# Patient Record
Sex: MALE | Race: WHITE
[De-identification: names, ages, dates, MRNs, and addresses within clinical notes are randomized per-mention and may not be internally consistent; named-entity substitution may affect disease eponyms.]

---

## 2018-06-18 NOTE — PCM.PREANE
Preanesthetic Assessment





- Anesthesia/Transfusion/Family Hx


Anesthesia History: Prior Anesthesia Without Reaction


Family History of Anesthesia Reaction: No





- Review of Systems


General: No Symptoms


Pulmonary: No Symptoms


Cardiovascular: Other (arrythmia a flutter)


Gastrointestinal: No Symptoms


Neurological: No Symptoms


Other: Reports: None, Easy Bleeding (on elliquis)





- Physical Assessment


O2 Sat by Pulse Oximetry: 93


Respiratory Rate: 18


Vital Signs: 





 Last Vital Signs











Temp  36.8 C   06/18/18 09:30


 


Pulse  63   06/18/18 10:00


 


Resp  18   06/18/18 10:00


 


BP  128/80   06/18/18 10:00


 


Pulse Ox  93 L  06/18/18 10:00











Height: 1.68 m


Weight: 82.7 kg


ASA Class: 2


Mental Status: Alert & Oriented x3


Airway Class: Mallampati = 1


Dentition: Reports: Normal Dentition


ROM/Head Extension: Full


Lungs: Clear to Auscultation, Normal Respiratory Effort


Cardiovascular: Regular Rate, Regular Rhythm





- Allergies


Allergies/Adverse Reactions: 


 Allergies











Allergy/AdvReac Type Severity Reaction Status Date / Time


 


No Known Allergies Allergy   Verified 03/11/15 14:53














- Blood


Blood Available: No





- Anesthesia Plan


Pre-Op Medication Ordered: None





- Acknowledgements


Anesthesia Type Planned: General Anesthesia


Pt an Appropriate Candidate for the Planned Anesthesia: Yes


Alternatives and Risks of Anesthesia Discussed w Pt/Guardian: Yes


Pt/Guardian Understands and Agrees with Anesthesia Plan: Yes


Additional Comments: 





scheduled for elective cardioversion of an outpatient with a flutter, who has 

been anticoagulated and placed on antiarrythmics for rate control.  

Appropriately NPO, good airway, consent obtained, questions answered. 





PreAnesthesia Questionnaire





- CURRENT (IN HOUSE) MEDS


Current Meds: 





 Current Medications





Lactated Ringer's (Ringers, Lactated)  1,000 mls @ 125 mls/hr IV ASDIRECTED AFSHIN





Discontinued Medications





Ephedrine Sulfate (Ephedrine Sulfate) Confirm Administered Dose 50 mg .ROUTE 

.STK-MED ONE


   Stop: 06/18/18 10:40


Midazolam HCl (Versed 1 Mg/Ml) Confirm Administered Dose 2 mg .ROUTE .STK-MED 

ONE


   Stop: 06/18/18 10:39


Propofol (Diprivan  20 Ml) Confirm Administered Dose 200 mg .ROUTE .STK-MED ONE


   Stop: 06/18/18 10:39

## 2020-02-23 NOTE — EDM.PDOC
ED HPI GENERAL MEDICAL PROBLEM





- General


Chief Complaint: Upper Extremity Injury/Pain


Stated Complaint: LEFT ANKLE PAIN


Time Seen by Provider: 02/23/20 17:11


Source of Information: Reports: Patient


History Limitations: Reports: No Limitations





- History of Present Illness


INITIAL COMMENTS - FREE TEXT/NARRATIVE: 


HISTORY AND PHYSICAL:





History of present illness:


Patient is a 64-year-old male presents to the ED With complaint of left ankle 

injury. Patient states that 1 week ago he was getting a horse out of a trailer. 

He states the horse back up over his left leg either stepping on or hitting his 

left ankle. He has been walking on it but states it is not getting better. He 

does take tramadol for knee pain and states he has been needing to take the 

tramadol more frequently due to the pain. 





Review of systems: 


As per history of present illness and below otherwise all systems reviewed and 

negative.





Past medical history: 


As per history of present illness and as reviewed below otherwise 

noncontributory.





Surgical history: 


As per history of present illness and as reviewed below otherwise 

noncontributory.





Social history: 


No reported history of drug or alcohol abuse.





Family history: 


As per history of present illness and as reviewed below otherwise 

noncontributory.





Physical exam:


General: Patient sitting comfortably in no acute distress and nontoxic appearing


HEENT: Atraumatic, normocephalic, pupils reactive, negative for conjunctival 

pallor or scleral icterus, mucous membranes moist, throat clear, neck supple, 

nontender, trachea midline. No meningeal signs. 


Lungs: Clear to auscultation, breath sounds equal bilaterally, chest nontender.


Heart: S1S2, regular, negative for clicks, rubs, or overt murmur.


Abdomen: Soft, nondistended, nontender. Negative for masses or 

hepatosplenomegaly. Negative for costovertebral tenderness. No rigidity, rebound

, guarding.


Pelvis: Stable nontender.


Genitourinary: Deferred.


Rectal: Deferred.


Extremities: Mild swelling to the medial ankle. No obvious deformity. Skin is 

intact. Pain to palpation along the medial malleolus. Atraumatic, negative for 

cords or calf pain. Neurovascular unremarkable.


Neuro: Awake, alert, oriented. Cranial nerves II through XII unremarkable. 

Cerebellum unremarkable. Motor and sensory unremarkable throughout. Exam 

nonfocal.





Notes: 





Diagnostics:


Left ankle x-ray 





Therapeutics:


post mold splint, crutches 





Prescriptions:


Tramadol (#15) 





Impression: 


Posterior malleolus fracture 





Plan:


Ice, elevate, and motrin or tylenol as needed


Follow up with orthopedics, please call the number provided to schedule an 

appointment


Return to ED as needed as discussed








Definitive disposition and diagnosis as appropriate pending reevaluation and 

review of above.








  ** left lower extremity


Pain Score (Numeric/FACES): 5





- Related Data


 Allergies











Allergy/AdvReac Type Severity Reaction Status Date / Time


 


No Known Allergies Allergy   Verified 12/14/18 01:13











Home Meds: 


 Home Meds





Acetaminophen [Tylenol Arthritis] 650 mg PO DAILY 12/14/18 [History]


Amiodarone [Cordarone] 0.5 tab PO DAILY 12/14/18 [History]


Metoprolol Tartrate 1 tab PO BID 12/14/18 [History]


Omeprazole 1 cap PO DAILY 12/14/18 [History]


sitaGLIPtin Phos/Metformin HCl [Janumet 50-1,000 MG] 1 tab PO BID 12/14/18 [

History]


traMADol HCl [Tramadol HCl ER] 1 tab PO DAILY 12/14/18 [History]


traMADol HCl [Tramadol HCl] 1 tab PO DAILY PRN 12/14/18 [History]











Past Medical History


Endocrine/Metabolic History: Reports: Diabetes, Type II





- Past Surgical History


HEENT Surgical History: Reports: Eye Surgery


Cardiovascular Surgical History: Reports: Cardiac Ablation


GI Surgical History: Reports: Hernia, Inguinal





Social & Family History





- Family History


Family Medical History: Noncontributory





- Caffeine Use


Caffeine Use: Reports: Coffee, Soda, Tea





Review of Systems





- Review of Systems


Review Of Systems: Comprehensive ROS is negative, except as noted in HPI.





ED EXAM, GENERAL





- Physical Exam


Exam: See Below (see dictation)





Course





- Vital Signs


Last Recorded V/S: 


 Last Vital Signs











Temp  97.2 F   02/23/20 17:09


 


Pulse  71   02/23/20 17:09


 


Resp  16   02/23/20 17:09


 


BP  158/97 H  02/23/20 17:09


 


Pulse Ox  95   02/23/20 17:09














Departure





- Departure


Time of Disposition: 18:45


Disposition: Home, Self-Care 01


Condition: Good


Clinical Impression: 


 Fracture of malleolus








- Discharge Information


Referrals: 


Donald Rodriguez MD [Primary Care Provider] - 


Forms:  ED Department Discharge


Additional Instructions: 


The following information is given to patients seen in the emergency department 

who are being discharged to home. This information is to outline your options 

for follow-up care. We provide all patients seen in our emergency department 

with a follow-up referral.





The need for follow-up, as well as the timing and circumstances, are variable 

depending upon the specifics of your emergency department visit.





If you don't have a primary care physician on staff, we will provide you with a 

referral. We always advise you to contact your personal physician following an 

emergency department visit to inform them of the circumstance of the visit and 

for follow-up with them and/or the need for any referrals to a consulting 

specialist.





The emergency department will also refer you to a specialist when appropriate. 

This referral assures that you have the opportunity for follow-up care with a 

specialist. All of these measure are taken in an effort to provide you with 

optimal care, which includes your follow-up.





Under all circumstances we always encourage you to contact your private 

physician who remains a resource for coordinating your care. When calling for 

follow-up care, please make the office aware that this follow-up is from your 

recent emergency room visit. If for any reason you are refused follow-up, 

please contact the [facility] Emergency 

Department at [phone] and asked to speak to the emergency department 

charge nurse.











[facility]


Specialty Care - Orthopedic Clinic


[address], [address]


[place] [address]


Phone: [phone]





1. Ice, elevate, and motrin or tylenol as needed. You may take tramadol as 

needed for severe pain. 


2. Follow up with orthopedics, please call the number provided to schedule an 

appointment


3. Return to ED as needed as discussed 





Sepsis Event Note





- Evaluation


Sepsis Screening Result: No Definite Risk





- Focused Exam


Vital Signs: 


 Vital Signs











  Temp Pulse Resp BP Pulse Ox


 


 02/23/20 17:09  97.2 F  71  16  158/97 H  95











Date Exam was Performed: 02/23/20


Time Exam was Performed: 18:44

## 2020-02-23 NOTE — CR
Left ankle: 3 views left ankle were obtained.

 

Comparison: No previous study.

 

Soft tissue swelling is identified.  Plantar spur is noted.  Vascular 

calcification is seen.  Lucent line is identified through the 

posterior malleolus likely due to fracture.  Bony density noted at the

 base of the 5th metatarsal which is well-corticated most likely 

represents an old injury.  No additional abnormality is appreciated.

 

Impression:

1.  Probable acute posterior malleolus fracture.  Please correlate 

that patient is symptomatic to this area.

2.  Bony density off the base of the 5th metatarsal most likely due to

 old ununited fracture.  If patient is symptomatic to this area, foot 

exam then recommended.

3.  Soft tissue swelling and other findings as noted above.

 

Diagnostic code #3

 

This report was dictated in [place] Standard Time

## 2020-11-03 ENCOUNTER — HOSPITAL ENCOUNTER (EMERGENCY)
Dept: HOSPITAL 56 - MW.ED | Age: 65
Discharge: HOME | End: 2020-11-03
Payer: MEDICARE

## 2020-11-03 VITALS — HEART RATE: 73 BPM | SYSTOLIC BLOOD PRESSURE: 156 MMHG | DIASTOLIC BLOOD PRESSURE: 79 MMHG

## 2020-11-03 DIAGNOSIS — S02.31XA: Primary | ICD-10-CM

## 2020-11-03 DIAGNOSIS — Z79.899: ICD-10-CM

## 2020-11-03 DIAGNOSIS — I48.91: ICD-10-CM

## 2020-11-03 DIAGNOSIS — I10: ICD-10-CM
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
## 2020-11-03 DIAGNOSIS — E11.9: ICD-10-CM

## 2020-11-03 PROCEDURE — 70450 CT HEAD/BRAIN W/O DYE: CPT

## 2020-11-03 PROCEDURE — 72125 CT NECK SPINE W/O DYE: CPT

## 2020-11-03 PROCEDURE — 99283 EMERGENCY DEPT VISIT LOW MDM: CPT

## 2020-11-03 PROCEDURE — 70486 CT MAXILLOFACIAL W/O DYE: CPT

## 2020-11-03 NOTE — CT
Indication:



Kicked in the face by a calf yesterday



Technique:



CT examination of the facial bones was performed. Imaging was acquired from 

above the frontal sinuses through the hyoid bone. Contrast not 

administered. Sagittal and coronal reformatted imaging performed



Comparison:



None prior to today



Findings:



There is an orbital floor fracture on the right. This is a relatively large 

fracture where the majority of the floor is downwardly displaced. Maximum 

downward displacement is about 7 millimeters as measured from coronal image 

number 34. This does not have a typical angled trap door morphology but 

rather the near entirety of the floor is downwardly displaced. However, the 

inferior rectus muscle is not entrapped and is above the floor. There is 

soft tissue swelling in this area. There is pre-existing chronic appearing 

sinus mucosal inflammatory disease parent the fracture involves the 

infraorbital canal on the right. No additional fractures. 



There is soft tissue swelling especially in the preseptal right orbital 

area. There may be a subtle fracture of the nasal bone on the right versus 

is a 1 millimeter foreign body as seen on axial image 53 of 82. The globe 

is intact. There is no retro subpleural or intraconal involvement 



Impression:



1. Orbital floor fracture on the right as described. 



2. Right periorbital soft tissue swelling. The globe is intact and there is 

no involvement of the retro subtotal or intraconal space on the right. 



3. Soft tissue swelling overlying the nose especially towards the right. 

There is a tiny fracture versus a tiny foreign body as described



Please note that all CT scans at this facility use dose modulation, 

iterative reconstruction, and/or weight-based dosing when appropriate to 

reduce radiation dose to as low as reasonably achievable.



Dictated by Evgeny Ramos MD @ Nov  3 2020  2:06PM



Signed by Dr. Evgeny Ramos @ Nov  3 2020  2:12PM

## 2020-11-03 NOTE — CT
INDICATION:



Kicked in the face by a calf yesterday. 



COMPARISON:



None 



TECHNIQUE:



CT examination of the cervical spine is performed without contrast using 

spiral technique. Thin axial, sagittal and coronal reconstructions were 

made. 



Please note that all CT scans at this facility use dose modulation, 

iterative reconstruction, and/or weight-based dosing when appropriate to 

reduce radiation dose to as low as reasonably achievable. 



FINDINGS: :



Bone mineral density appears decreased. There is no lytic or blastic 

lesion, fracture or dislocation identified involving the spine. There are 

moderate degenerative changes diffusely. There is an orbital region 

fracture on the right as more fully described in the facial bone report. 



IMPRESSION:



1. No fracture, dislocation or destructive process involving the spine. 

Degenerative changes. 



2. Orbital region fracture on the right as more fully described on the 

facial bone report



Please note that all CT scans at this facility use dose modulation, 

iterative reconstruction, and/or weight-based dosing when appropriate to 

reduce radiation dose to as low as reasonably achievable.



Dictated by Evgeny Ramos MD @ Nov  3 2020  2:02PM



Signed by Dr. Evgeny Ramos @ Nov  3 2020  2:06PM

## 2020-11-03 NOTE — EDM.PDOC
ED HPI GENERAL MEDICAL PROBLEM





- General


Chief Complaint: Trauma


Stated Complaint: TRAUMA ALERT


Time Seen by Provider: 11/03/20 13:12


Source of Information: Reports: Patient


History Limitations: Reports: No Limitations





- History of Present Illness


INITIAL COMMENTS - FREE TEXT/NARRATIVE: 





65-year-old male past medical history hypertension, diabetes presents after 

getting kicked in the head by a cow.  Patient states this occurred about 24 

hours ago.  Hit him in the right thigh.  He notes swelling and pain around the 

right eye.  Denies changes in vision.  Notes mild pain in right posterior neck. 

No difficulty breathing, chest pain, abdominal pain, nausea, vomiting.  He has 

been ambulatory after the accident.  He took tramadol for pain at home.  He is 

on a baby aspirin but denies any other antiplatelet or anticoagulation use.


  ** Right Eye


Pain Score (Numeric/FACES): 3





- Related Data


                                    Allergies











Allergy/AdvReac Type Severity Reaction Status Date / Time


 


No Known Allergies Allergy   Verified 11/03/20 13:27











Home Meds: 


                                    Home Meds





Acetaminophen [Tylenol Arthritis] 650 mg PO ASDIRECTED 12/14/18 [History]


Amiodarone [Cordarone] 1 tab PO DAILY 12/14/18 [History]


Metoprolol Tartrate 1 tab PO BID 12/14/18 [History]


Omeprazole 2 cap PO DAILY 12/14/18 [History]


sitaGLIPtin Phos/Metformin HCl [Janumet 50-1,000 MG] 1 tab PO BID 12/14/18 

[History]


traMADol HCl [Tramadol HCl ER] 1 tab PO DAILY 12/14/18 [History]


traMADol HCl [Tramadol HCl] 1 tab PO DAILY PRN 12/14/18 [History]


Amoxicillin/Potassium Clav [Augmentin 875-125 Tablet] 1 each PO BID 7 Days #14 

tablet 11/03/20 [Rx]


Pioglitazone [Actos] 1 dose PO DAILY 11/03/20 [History]











Past Medical History


Cardiovascular History: Reports: Afib, Hypertension


Other Musculoskeletal History: knee surgery


Endocrine/Metabolic History: Reports: Diabetes, Type II





- Past Surgical History


HEENT Surgical History: Reports: Eye Surgery


Cardiovascular Surgical History: Reports: Cardiac Ablation


GI Surgical History: Reports: Hernia, Inguinal





Social & Family History





- Family History


Family Medical History: Noncontributory





- Caffeine Use


Caffeine Use: Reports: Coffee, Soda, Tea





Review of Systems





- Review of Systems


Review Of Systems: Comprehensive ROS is negative, except as noted in HPI.





ED EXAM, GENERAL





- Physical Exam


Exam: See Below


Exam Limited By: No Limitations


General Appearance: Alert, WD/WN, No Apparent Distress


Eye Exam: Bilateral Eye: EOMI, PERRL


Ears: Normal External Exam


Nose: Normal Inspection


Throat/Mouth: Normal Voice, No Airway Compromise


Head: Normocephalic, Other (significant swelling of R periorbital face w/ 

overlying eyelid abrasions)


Neck: Normal Inspection.  No: Tender Midline


Respiratory/Chest: No Respiratory Distress, Lungs Clear, Normal Breath Sounds, 

No Accessory Muscle Use


Cardiovascular: Normal Peripheral Pulses, Regular Rate, Rhythm


GI/Abdominal: Soft, Non-Tender


Back Exam: Normal Inspection


Extremities: Normal Inspection


Neurological: Alert, Oriented, CN II-XII Intact


Psychiatric: Normal Affect, Normal Mood


Skin Exam: Warm, Dry, Intact





Course





- Vital Signs


Last Recorded V/S: 


                                Last Vital Signs











Temp  98.3 F   11/03/20 13:15


 


Pulse  67   11/03/20 13:15


 


Resp  16   11/03/20 13:15


 


BP  145/80 H  11/03/20 13:15


 


Pulse Ox  98   11/03/20 13:15














- Re-Assessments/Exams


Free Text/Narrative Re-Assessment/Exam: 





11/03/20 13:16


Will get CT head, max-face, C-spine.  Will follow up results and disposition 

accordingly.


11/03/20 14:16


CT imaging is remarkable for orbital floor fracture.  Will discharge with 

prophylactic antibiotics and ophthalmology follow-up.  Return precautions 

discussed for entrapment.





Departure





- Departure


Time of Disposition: 14:16


Disposition: Home, Self-Care 01


Condition: Good


Clinical Impression: 


 Orbital floor (blow-out) closed fracture








- Discharge Information


Prescriptions: 


Amoxicillin/Potassium Clav [Augmentin 875-125 Tablet] 1 each PO BID 7 Days #14 

tablet


Instructions:  Orbital Floor Fracture With Entrapment


Forms:  ED Department Discharge


Additional Instructions: 


CT imaging is remarkable for orbital floor fracture.  You do not have any 

evidence of entrapment of your muscles.  You should follow-up with an 

ophthalmologist for reassessment.  I have also prescribed you antibiotics to 

prevent infection.





The following information is given to patients seen in the emergency department 

who are being discharged to home. This information is to outline your options 

for follow-up care. We provide all patients seen in our emergency department 

with a follow-up referral.





The need for follow-up, as well as the timing and circumstances, are variable 

depending upon the specifics of your emergency department visit.





If you don't have a primary care physician on staff, we will provide you with a 

referral. We always advise you to contact your personal physician following an 

emergency department visit to inform them of the circumstance of the visit and 

for follow-up with them and/or the need for any referrals to a consulting 

specialist.





The emergency department will also refer you to a specialist when appropriate. 

This referral assures that you have the opportunity for follow-up care with a 

specialist. All of these measure are taken in an effort to provide you with 

optimal care, which includes your follow-up.





Under all circumstances we always encourage you to contact your private 

physician who remains a resource for coordinating your care. When calling for 

follow-up care, please make the office aware that this follow-up is from your 

recent emergency room visit. If for any reason you are refused follow-up, please

contact the [facility] Emergency Department

at [phone] and asked to speak to the emergency department charge nurse.





Please follow up with your primary care physician. If you do not have a primary 

care physician, see below:


[facility]


[address] [place] [id number] [phone]





[facility]


[address] [place] [address] [phone]








Sepsis Event Note (ED)





- Focused Exam


Vital Signs: 


                                   Vital Signs











  Temp Pulse Resp BP Pulse Ox


 


 11/03/20 13:15  98.3 F  67  16  145/80 H  98

## 2020-11-03 NOTE — CT
INDICATION:



Kicked in face by a calf yesterday. 



COMPARISON:



None 



TECHNIQUE:



CT examination of the head was performed as axial sections without 

intravenous contrast. Images were obtained from the vertex of the skull 

through the skull base.



Please note that all CT scans at this facility use dose modulation, 

iterative reconstruction, and/or weight-based dosing when appropriate to 

reduce radiation dose to as low as reasonably achievable. 



FINDINGS:



The brain shows no sign of mass lesion, mass effect, hemorrhage, or edema. 



The ventricles and sulci are normal in appearance for the patient`s age.



The visualized portions of the orbits are normal in appearance.



There is soft tissue swelling in the region of the right orbit and there is 

a right orbital region fracture which will be further described in the 

facial bone report. 



IMPRESSION:



1. No visible acute intracranial posttraumatic findings. 



2. Soft tissue swelling in the right orbital region within orbital region 

fracture which is more fully characterized in the facial bone report under 

separate cover



Please note that all CT scans at this facility use dose modulation, 

iterative reconstruction, and/or weight-based dosing when appropriate to 

reduce radiation dose to as low as reasonably achievable.



Dictated by Evgeny Ramos MD @ Nov  3 2020  1:59PM



Signed by Dr. Evgeny Ramos @ Nov  3 2020  2:02PM

## 2021-11-08 ENCOUNTER — HOSPITAL ENCOUNTER (EMERGENCY)
Dept: HOSPITAL 56 - MW.ED | Age: 66
Discharge: HOME | End: 2021-11-08
Payer: MEDICARE

## 2021-11-08 VITALS — HEART RATE: 100 BPM

## 2021-11-08 VITALS — DIASTOLIC BLOOD PRESSURE: 83 MMHG | SYSTOLIC BLOOD PRESSURE: 140 MMHG

## 2021-11-08 DIAGNOSIS — E11.9: ICD-10-CM

## 2021-11-08 DIAGNOSIS — U07.1: Primary | ICD-10-CM

## 2021-11-08 DIAGNOSIS — Z79.899: ICD-10-CM

## 2021-11-08 DIAGNOSIS — I10: ICD-10-CM

## 2021-11-08 PROCEDURE — 99283 EMERGENCY DEPT VISIT LOW MDM: CPT

## 2021-11-08 PROCEDURE — U0002 COVID-19 LAB TEST NON-CDC: HCPCS

## 2021-11-08 NOTE — EDM.PDOC
ED HPI GENERAL MEDICAL PROBLEM





- General


Chief Complaint: General


Stated Complaint: COVID SYMTPOMS


Time Seen by Provider: 11/08/21 21:17





- History of Present Illness


INITIAL COMMENTS - FREE TEXT/NARRATIVE: 





CHIEF COMPLAINT(S): Tired








HISTORY OF PRESENT ILLNESS: This is a 66-year-old man with a past medical 

history of diabetes mellitus, hypertension, atrial fibrillation who comes to the

emergency department with a chief complaint of tired.  The patient states that 

he has been extremely tired and was been sleeping all day.  He states that this 

has been going on for approximately 1 week to 10 days.  He has had intermittent 

fever and some shortness of breath with nonproductive cough.  He states that he 

is in the emergency department because his wife is concerned because he does not

usually sleep this much.  He denies any chest pain, swelling of his legs, recent

travel, recent surgery or prior history of DVT or PE.  They state they are 

mainly concerned that he has Covid





 


REVIEW OF SYSTEMS: 





Constitutional: Positive fatigue and fevers


Eyes: Denies eye pain


Ears, Nose, Mouth, & Throat: Denies earache


Cardiovascular: Denies chest pain


Respiratory: Positive for shortness of breath and nonproductive cough


Gastrointestinal: Denies Nausea, vomiting, diarrhea, hematochezia.


Genitourinary: Denies hematuria


Skin:Denies a rash


MSK: Denies joint pain


Neurological: Denies blurred vision


Psychiatric: Denies depression








PAST MEDICAL HISTORY: As per history of present illness and as reviewed below 

otherwise noncontributory.





SURGICAL HISTORY: As per history of present illness and as reviewed below 

otherwise noncontributory.





SOCIAL HISTORY: As per history of present illness and as reviewed below 

otherwise noncontributory.





FAMILY HISTORY: As per history of present illness and as reviewed below 

otherwise noncontributory.








EXAMINATION OF ORGAN SYSTEMS/BODY AREAS: 





Constitutional: Blood pressure is 140/83, heart rate 85, respiratory rate 22 

with an oxygen saturation of 95% on room air.  Temperature 38.1


General: Middle-aged man who does not appear to be in any acute distress


Psychiatric: Appropriate mood and affect.


Eyes: No scleral icterus or conjunctival erythema


ENMT: Moist mucous membranes. No pharyngeal erythema


Cardiovascular: Regular, rate, and rhythm.  No gallops, murmurs, or rubs.  

Bilateral upper extremity pulses symmetric and intact.  No peripheral edema.  No

JVD.


Respiratory: Lungs clear to auscultation bilaterally.  No wheezes, rales, or 

rhonchi.


Gastrointestinal: Soft, non-tender, non-distended.  Normoactive bowel sounds


Genitourinary: No suprapubic tenderness


Musculoskeletal: Normal range of motion.


Skin: No lesions or abrasions.


Neurological:     Alert, GCS 15








MEDICAL DECISION MAKING AND COURSE IN THE ED WITH INTERPRETATION/REVIEW OF 

DIAGNOSTIC STUDIES: This is a 66-year-old man with a past medical history of 

hypertension, diabetes mellitus who comes to the emergency department with 10 

days of intermittent fever, cough and shortness of breath who is borderline 

hypoxic on room air and borderline febrile who overall appears well.  At this 

time will obtain a COVID-19 swab.  I do not believe any other labs or imaging 

are indicated.  Patient was amenable to this plan.





Laboratory: COVID-19 is positive.





Given the patient is 10 days into his illness the patient is not a candidate for

Regeneron therapy.  I did discuss symptomatic treatment at home and strict 

return precautions.  The patient was amenable discharge and had no further 

questions.





DISPOSITION: The patient was discharged home in stable condition. The patient 

will follow up with primary care physician in 3 to 5 days





CONDITION: Fair





PROCEDURES: None





FINAL IMPRESSION(S)/DIAGNOSES: 





1.  Acute COVID-19 infection





 





Manan Black M.D.





- Related Data


                                    Allergies











Allergy/AdvReac Type Severity Reaction Status Date / Time


 


No Known Allergies Allergy   Verified 11/08/21 20:20











Home Meds: 


                                    Home Meds





Acetaminophen [Tylenol Arthritis] 650 mg PO ASDIRECTED 12/14/18 [History]


Amiodarone [Cordarone] 1 tab PO DAILY 12/14/18 [History]


Metoprolol Tartrate 1 tab PO BID 12/14/18 [History]


Omeprazole 2 cap PO DAILY 12/14/18 [History]


sitaGLIPtin Phos/Metformin HCl [Janumet 50-1,000 MG] 1 tab PO BID 12/14/18 

[History]


traMADol HCl [Tramadol HCl ER] 1 tab PO DAILY 12/14/18 [History]


traMADol HCl [Tramadol HCl] 1 tab PO DAILY PRN 12/14/18 [History]


Pioglitazone [Actos] 1 dose PO DAILY 11/03/20 [History]











Past Medical History


Cardiovascular History: Reports: Afib, Hypertension


Other Musculoskeletal History: knee surgery


Endocrine/Metabolic History: Reports: Diabetes, Type II


Hematologic History: Reports: None





- Past Surgical History


HEENT Surgical History: Reports: Eye Surgery


Cardiovascular Surgical History: Reports: Cardiac Ablation


GI Surgical History: Reports: Hernia, Inguinal





Social & Family History





- Family History


Family Medical History: No Pertinent Family History





- Tobacco Use


Second Hand Smoke Exposure: No





- Caffeine Use


Caffeine Use: Reports: None





- Recreational Drug Use


Recreational Drug Use: No





ED ROS GENERAL





- Review of Systems


Review Of Systems: See Below





ED EXAM, GENERAL





- Physical Exam


Exam: See Below





Course





- Vital Signs


Last Recorded V/S: 


                                Last Vital Signs











Temp  38.1 C   11/08/21 20:17


 


Pulse  100   11/08/21 22:22


 


Resp  18   11/08/21 22:22


 


BP  140/83   11/08/21 20:17


 


Pulse Ox  96   11/08/21 22:22














- Orders/Labs/Meds


Labs: 


                                Laboratory Tests











  11/08/21 Range/Units





  20:20 


 


SARS-CoV-2 RNA (JACQUES)  POSITIVE H  (NEGATIVE)  














Departure





- Departure


Time of Disposition: 22:49


Disposition: Home, Self-Care 01


Condition: Fair


Clinical Impression: 


 COVID-19








- Discharge Information


*PRESCRIPTION DRUG MONITORING PROGRAM REVIEWED*: No


*COPY OF PRESCRIPTION DRUG MONITORING REPORT IN PATIENT LYDIA: No


Instructions:  What You Should Know About COVID-19 to Protect Yourself and 

Others - CDC, 10 Things You Can Do to Manage Your COVID-19 Symptoms at Home - 

[facility] (07/16/2021), COVID-19: How to Protect Yourself and Others - CDC, COVID-19: 

Quarantine vs. Isolation - [facility] (12/17/2020), COVID-19: What to Do If You Are 

Sick- [facility] (03/17/2021)


Referrals: 


Donald Rodriguez MD [Primary Care Provider] - 


Forms:  ED Department Discharge


Additional Instructions: 


You should take acetaminophen 500-1000 mg every 6 hours as needed for fever and 

muscle aches.


Please drink plenty of fluids and get plenty of rest over the next several days.


We would recommend that she get a pulse oximeter from the pharmacy to keep an 

eye on your oxygen level.  If your oxygen level drops below 91%, you should 

return to the ED for evaluation.  You should return to the ER sooner if you 

start having any symptoms of shortness of breath or any other new or concerning 

symptoms.





1.  Your COVID-19 screening is positive.  That means you do have the coronavirus

and you are considered contagious.  Your vital signs and oxygen saturation are 

well enough that you were able to monitor your symptoms at home.  Continue to 

monitor for trouble breathing,  new confusion or inability to arouse, bluish 

lips or face or any of the other symptoms we discussed -if this occurs please 

return to the emergency room.


2.  Please self quarantine over the next 10 days.  Inform any persons that you 

have been in contact with since you started becoming symptomatic that you have 

tested positive; they should be made aware and take the appropriate steps as 

needed.


3.  May alternate Tylenol and ibuprofen as needed for pain and fever management.


4. The [facility] department will be calling you and following up with you. 

The [place] COVID 19 Hotline phone number [phone], They are open Monday - 

Friday 7am - 7pm. Follow up with your primary care provider for re-evaluation 

and re-testing after the 10 day quarantine and discuss when you should be seen.





[facility] - Primary [facility]                                              

 


[address] [place] [address]                                                             

               


Phone: [phone]                                                           

            


Fax: [phone]    





[place] [address]                                                             

               


Phone: [phone]                                                           

                                


 Fax: [phone]





The patient is informed of any results of their evaluation and diagnostic workup

and all questions are answered. They are given discharge instructions and return

precautions. The patient is stable for discharge.  The patient states they 

understand and agree with the plan and that they will return if their symptoms 

get worse or if they have any new concerns.





The following information is given to patients seen in the emergency department 

who are being discharged to home. This information is to outline your options 

for follow-up care. We provide all patients seen in our emergency department 

with a follow-up referral.





The need for follow-up, as well as the timing and circumstances, are variable 

depending upon the specifics of your emergency department visit.





If you don't have a primary care physician on staff, we will provide you with a 

referral. We always advise you to contact your personal physician following an 

emergency department visit to inform them of the circumstance of the visit and 

for follow-up with them and/or the need for any referrals to a consulting 

specialist.





The emergency department will also refer you to a specialist when appropriate. 

This referral assures that you have the opportunity for follow-up care with a 

specialist. All of these measure are taken in an effort to provide you with 

optimal care, which includes your follow-up.





Under all circumstances we always encourage you to contact your private 

physician who remains a resource for coordinating your care. When calling for 

follow-up care, please make the office aware that this follow-up is from your 

recent emergency room visit. If for any reason you are refused follow-up, please

contact the [facility] Emergency Department

at [phone] and asked to speak to the emergency department charge nurse.











Sepsis Event Note (ED)





- Evaluation


Sepsis Screening Result: No Definite Risk





- Focused Exam


Vital Signs: 


                                   Vital Signs











  Temp Pulse Resp BP Pulse Ox


 


 11/08/21 22:22   100  18   96


 


 11/08/21 20:17  38.1 C  85  22 H  140/83  94 L

## 2021-11-11 ENCOUNTER — HOSPITAL ENCOUNTER (EMERGENCY)
Dept: HOSPITAL 56 - MW.ED | Age: 66
Discharge: HOME | End: 2021-11-11
Payer: MEDICARE

## 2021-11-11 VITALS — HEART RATE: 87 BPM | DIASTOLIC BLOOD PRESSURE: 73 MMHG | SYSTOLIC BLOOD PRESSURE: 136 MMHG

## 2021-11-11 DIAGNOSIS — E11.65: ICD-10-CM

## 2021-11-11 DIAGNOSIS — I10: ICD-10-CM

## 2021-11-11 DIAGNOSIS — U07.1: Primary | ICD-10-CM

## 2021-11-11 DIAGNOSIS — I48.91: ICD-10-CM

## 2021-11-11 DIAGNOSIS — Z79.899: ICD-10-CM

## 2021-11-11 LAB
BUN SERPL-MCNC: 24 MG/DL (ref 7–18)
CHLORIDE SERPL-SCNC: 99 MMOL/L (ref 98–107)
CO2 SERPL-SCNC: 28.1 MMOL/L (ref 21–32)
GLUCOSE SERPL-MCNC: 193 MG/DL (ref 74–106)
POTASSIUM SERPL-SCNC: 4.3 MMOL/L (ref 3.5–5.1)
SODIUM SERPL-SCNC: 136 MMOL/L (ref 136–148)

## 2021-11-11 PROCEDURE — 96374 THER/PROPH/DIAG INJ IV PUSH: CPT

## 2021-11-11 PROCEDURE — 85025 COMPLETE CBC W/AUTO DIFF WBC: CPT

## 2021-11-11 PROCEDURE — 99285 EMERGENCY DEPT VISIT HI MDM: CPT

## 2021-11-11 PROCEDURE — 80053 COMPREHEN METABOLIC PANEL: CPT

## 2021-11-11 PROCEDURE — 36415 COLL VENOUS BLD VENIPUNCTURE: CPT

## 2021-11-11 PROCEDURE — 81001 URINALYSIS AUTO W/SCOPE: CPT

## 2021-11-11 PROCEDURE — 71045 X-RAY EXAM CHEST 1 VIEW: CPT

## 2021-11-11 NOTE — EDM.PDOC
ED HPI GENERAL MEDICAL PROBLEM





- General


Chief Complaint: General


Stated Complaint: HASNT BEEN EATING SICK


Time Seen by Provider: 11/11/21 14:23


Source of Information: Reports: Patient


History Limitations: Reports: No Limitations





- History of Present Illness


INITIAL COMMENTS - FREE TEXT/NARRATIVE: 


HISTORY AND PHYSICAL:





History of present illness:


Patient is a 66-year-old male who presents to the emergency room with known 

COVID-19 who has complaints of decreased appetite and nausea/vomiting with 

attempting to eat.  Patient states he hasn't eaten in 3 to 4 days.  Today he att

empted to eat some tomato soup but immediately had an emesis.  He states he 

feels generally weak and fatigued.  He has had COVID type symptoms for the past 

2 weeks. Patient denies any fever, chills, headache, change in vision, syncope 

or near syncope. Denies any chest pain, back pain, shortness of breath or cough.

Denies any abdominal pain, diarrhea, constipation or dysuria. Has not noted any 

blood in urine or stool. 





Review of systems: 


As per history of present illness and below otherwise all systems reviewed and 

negative.





Past medical history: 


As per history of present illness and as reviewed below otherwise 

noncontributory.





Surgical history: 


As per history of present illness and as reviewed below otherwise 

noncontributory.





Social history: 


See social history for further information





Family history: 


As per history of present illness and as reviewed below otherwise 

noncontributory.





Physical exam:


General: Well developed and well nourished. Alert and orientated x 3. Nontoxic i

n appearance and in no acute distress. Vital signs are stable and have been 

reviewed by me. Nursing notes were reviewed. 


HEENT: Atraumatic, normocephalic, pupils equal and reactive bilaterally, negat

rc for conjunctival pallor or scleral icterus, mucous membranes dry, TMs normal

bilaterally, throat clear, neck supple, nontender, trachea midline. No drooling 

or trismus noted. No meningeal signs. No hot potato voice noted. 


Lungs: Clear to auscultation bilaterally. No wheezes, rales, or rhonchi. Chest 

nontender. Normal work of breathing, no accessory muscles used.


Heart: S1S2, regular rate and rhythm without overt murmur, gallops, or rubs. No 

JVD. No peripheral edema


Abdomen: Soft, nondistended, nontender. Normoactive bowel sounds. Negative for 

masses or costovertebral tenderness.


Pelvis: Stable nontender.


Genitourinary/Rectal: Deferred.


Skin: Intact, warm, dry. No lesions or rashes noted.


Hematologic: No petechiae or purpra. Mucosa appropriate color and normal nail 

bed color and refill.


Extremities: Atraumatic, moves all extremities per self without difficulty or 

deficits, negative for cords or calf pain. Neurovascular unremarkable.


Neuro: Awake, alert, oriented. Cranial nerves II through XII unremarkable. 

Cerebellum unremarkable. Motor and sensory unremarkable throughout. Exam 

nonfocal.


Psychiatric: Mood and affect are appropriate.  Normal thought process. Answering

questions appropriately.





Please note that the patient was seen and evaluated during the 2020 SARS-CoV-2 

novel coronavirus pandemic period.  Community viral transmission is ongoing at 

time of this encounter and the emergency department is operating under pandemic 

response procedures.





Medical Decision Making:


Patient is a 66-year-old male who presents to the emergency room with known 

COVID-19 (symptoms x2 weeks) who complains of anorexia, decreased p.o. intake 

and nausea/vomiting after trying to eat today.  Patient was not a candidate for 

monoclonal antibody therapy upon his diagnosis of COVID-19.  Physical exam is 

unremarkable.  His vital signs are stable.  He does clinically appear 

dehydrated, will give him some fluids while waiting on basic labs.





Mild derangements on labs, nothing concerning today.  Patient is a type II 

diabetic and states he has not been taking his medications due to his decreased 

appetite.  Anion gap is 8.9.  Chest x-ray shows questionable patchy right 

infiltrates. I have talked with the patient about today's findings, in addition 

to providing specific details for plan of care.  Reassessment at the time of 

disposition demonstrates that the patient is in no acute distress.  Encourage 

patient to follow-up with his primary care provider for reevaluation and further

management of his type 2 diabetes.  The patient is stable for discharge, 

counseling was provided and we discussed in great detail signs and symptoms that

would prompt them to return to the Emergency Department. Medication, follow up 

and supportive care measures were reviewed and discussed. Voices understanding 

and is agreeable to plan of care. Denies any further questions or concerns at 

this time.





Diagnostics:


CBC, CMP, chest x-ray





Therapeutics:


IV fluid, Zofran, Toradol





Prescription:


Zofran





Impression: 


COVID-19


Type II diabetic





Plan:


1.  You were evaluated today on an emergent basis.  Your lab work and chest x-

ray show no acute or concerning findings.  Your vital signs and oxygen saturati

on are well enough that you were able to monitor your symptoms at home.  

Continue to monitor for trouble breathing,  new confusion or inability to 

arouse, bluish lips or face or any of the other symptoms we discussed -if this 

occurs please return to the emergency room immediately.


2.  Please self quarantine until cleared by [facility] Department.  Inform any

persons that you have been in contact with since you started becoming 

symptomatic that you have tested positive; they should be made aware and take 

the appropriate steps as needed.


3.  Zofran has been prescribed for nausea management.  You can take NyQuil 

during the evening to help get a restful night sleep. May alternate Tylenol and 

ibuprofen as needed for pain and fever management.


4. The [facility] department will be calling you and following up with you. 

The [place] COVID 19 Hotline phone number [phone], They are open Monday - 

Friday 7am - 7pm. Follow up with your primary care provider for re-evaluation as

directed.





Definitive disposition and diagnosis as appropriate pending reevaluation and 

review of above.








- Related Data


                                    Allergies











Allergy/AdvReac Type Severity Reaction Status Date / Time


 


No Known Allergies Allergy   Verified 11/11/21 14:48











Home Meds: 


                                    Home Meds





Acetaminophen [Tylenol Arthritis] 650 mg PO ASDIRECTED 12/14/18 [History]


Amiodarone [Cordarone] 1 tab PO DAILY 12/14/18 [History]


Metoprolol Tartrate 1 tab PO BID 12/14/18 [History]


Omeprazole 2 cap PO DAILY 12/14/18 [History]


sitaGLIPtin Phos/Metformin HCl [Janumet 50-1,000 MG] 1 tab PO BID 12/14/18 

[History]


traMADol HCl [Tramadol HCl ER] 1 tab PO DAILY 12/14/18 [History]


traMADol HCl [Tramadol HCl] 1 tab PO DAILY PRN 12/14/18 [History]


Pioglitazone [Actos] 1 dose PO DAILY 11/03/20 [History]


Ondansetron [Zofran ODT] 4 mg PO Q6H PRN #8 tab.dis 11/11/21 [Rx]











Past Medical History


Cardiovascular History: Reports: Afib, Hypertension


Other Musculoskeletal History: knee surgery


Endocrine/Metabolic History: Reports: Diabetes, Type II


Hematologic History: Reports: None





- Past Surgical History


HEENT Surgical History: Reports: Eye Surgery


Cardiovascular Surgical History: Reports: Cardiac Ablation


GI Surgical History: Reports: Hernia, Inguinal





Social & Family History





- Family History


Family Medical History: No Pertinent Family History





- Caffeine Use


Caffeine Use: Reports: None





ED ROS GENERAL





- Review of Systems


Review Of Systems: Comprehensive ROS is negative, except as noted in HPI.





ED EXAM, GENERAL





- Physical Exam


Exam: See Below (See dictation)





Course





- Vital Signs


Last Recorded V/S: 


                                Last Vital Signs











Temp  97.3 F   11/11/21 14:49


 


Pulse  87   11/11/21 14:49


 


Resp  17   11/11/21 14:49


 


BP  136/73   11/11/21 14:49


 


Pulse Ox  95   11/11/21 14:49














- Orders/Labs/Meds


Orders: 


                               Active Orders 24 hr











 Category Date Time Status


 


 Sodium Chloride 0.9% [Normal Saline] 1,000 ml Med  11/11/21 14:43 Active





 IV STAT   








                                Medication Orders





Sodium Chloride (Normal Saline)  1,000 mls @ 125 mls/hr IV STAT ONE


   Stop: 11/11/21 22:42


   Last Admin: 11/11/21 15:05  Dose: 125 mls/hr


   Documented by: FRANCIS








Labs: 


                                Laboratory Tests











  11/11/21 11/11/21 11/11/21 Range/Units





  15:00 15:00 15:00 


 


WBC   7.06   (4.0-11.0)  K/uL


 


RBC   4.77   (4.50-5.90)  M/uL


 


Hgb   14.2   (13.0-17.0)  g/dL


 


Hct   42.1   (38.0-50.0)  %


 


MCV   88.3   (80.0-98.0)  fL


 


MCH   29.8   (27.0-32.0)  pg


 


MCHC   33.7   (31.0-37.0)  g/dL


 


RDW Std Deviation   43.1   (28.0-62.0)  fl


 


RDW Coeff of Eusebio   13   (11.0-15.0)  %


 


Plt Count   265   (150-400)  K/uL


 


MPV   9.70   (7.40-12.00)  fL


 


Neut % (Auto)   84.0 H   (48.0-80.0)  %


 


Lymph % (Auto)   9.2 L   (16.0-40.0)  %


 


Mono % (Auto)   6.7   (0.0-15.0)  %


 


Eos % (Auto)   0.0   (0.0-7.0)  %


 


Baso % (Auto)   0.1   (0.0-1.5)  %


 


Neut # (Auto)   5.9 H   (1.4-5.7)  K/uL


 


Lymph # (Auto)   0.7   (0.6-2.4)  K/uL


 


Mono # (Auto)   0.5   (0.0-0.8)  K/uL


 


Eos # (Auto)   0.0   (0.0-0.7)  K/uL


 


Baso # (Auto)   0.0   (0.0-0.1)  K/uL


 


Nucleated RBC %   0.0   /100WBC


 


Nucleated RBCs #   0   K/uL


 


Sodium    136  (136-148)  mmol/L


 


Potassium    4.3  (3.5-5.1)  mmol/L


 


Chloride    99  ()  mmol/L


 


Carbon Dioxide    28.1  (21.0-32.0)  mmol/L


 


BUN    24 H  (7.0-18.0)  mg/dL


 


Creatinine    1.1  (0.8-1.3)  mg/dL


 


Est Cr Clr Drug Dosing    58.81  mL/min


 


Estimated GFR (MDRD)    > 60.0  ml/min


 


Glucose    193 H  ()  mg/dL


 


Calcium    9.0  (8.5-10.1)  mg/dL


 


Total Bilirubin    0.5  (0.2-1.0)  mg/dL


 


AST    19  (15-37)  IU/L


 


ALT    22  (14-63)  IU/L


 


Alkaline Phosphatase    81  ()  U/L


 


Total Protein    8.0  (6.4-8.2)  g/dL


 


Albumin    3.1 L  (3.4-5.0)  g/dL


 


Globulin    4.9 H  (2.6-4.0)  g/dL


 


Albumin/Globulin Ratio    0.6 L  (0.9-1.6)  


 


Urine Color  YELLOW    


 


Urine Appearance  CLEAR    


 


Urine pH  5.5    (5.0-8.0)  


 


Ur Specific Gravity  >= 1.030    (1.001-1.035)  


 


Urine Protein  30 H    (NEGATIVE)  mg/dL


 


Urine Glucose (UA)  250 H    (NEGATIVE)  mg/dL


 


Urine Ketones  TRACE H    (NEGATIVE)  mg/dL


 


Urine Occult Blood  TRACE-INTACT H    (NEGATIVE)  


 


Urine Nitrite  NEGATIVE    (NEGATIVE)  


 


Urine Bilirubin  SMALL H    (NEGATIVE)  


 


Urine Urobilinogen  0.2    (<2.0)  EU/dL


 


Ur Leukocyte Esterase  NEGATIVE    (NEGATIVE)  


 


Urine RBC  0-5    (0-2/HPF)  


 


Urine WBC  0-5    (0-5/HPF)  


 


Ur Epithelial Cells  RARE    (NONE-FEW)  


 


Urine Bacteria  NOT SEEN    (NEGATIVE)  











Meds: 


Medications











Generic Name Dose Route Start Last Admin





  Trade Name Freq  PRN Reason Stop Dose Admin


 


Sodium Chloride  1,000 mls @ 125 mls/hr  11/11/21 14:43  11/11/21 15:05





  Normal Saline  IV  11/11/21 22:42  125 mls/hr





  STAT ONE   Administration














Discontinued Medications














Generic Name Dose Route Start Last Admin





  Trade Name Freq  PRN Reason Stop Dose Admin


 


Ketorolac Tromethamine  30 mg  11/11/21 15:45  11/11/21 15:56





  Ketorolac 30 Mg/Ml Sdv  IVPUSH  11/11/21 15:46  30 mg





  ONETIME ONE   Administration














Departure





- Departure


Time of Disposition: 16:03


Disposition: Home, Self-Care 01


Clinical Impression: 


 COVID-19





DM type 2 (diabetes mellitus, type 2)


Qualifiers:


 Diabetes mellitus long term insulin use: without long term use Diabetes 

mellitus complication status: with hyperglycemia Qualified Code(s): E11.65 - 

Type 2 diabetes mellitus with hyperglycemia








- Discharge Information


Prescriptions: 


Ondansetron [Zofran ODT] 4 mg PO Q6H PRN #8 tab.dis


 PRN Reason: Nausea


Instructions:  10 Things You Can Do to Manage Your COVID-19 Symptoms at Home - 

[facility] (07/16/2021)


Referrals: 


Donald Rodriguez MD [Primary Care Provider] - 


Forms:  ED Department Discharge


Additional Instructions: 


The following information is given to patients seen in the emergency department 

who are being discharged to home. This information is to outline your options 

for follow-up care. We provide all patients seen in our emergency department 

with a follow-up referral.





The need for follow-up, as well as the timing and circumstances, are variable 

depending upon the specifics of your emergency department visit.





If you don't have a primary care physician on staff, we will provide you with a 

referral. We always advise you to contact your personal physician following an 

emergency department visit to inform them of the circumstance of the visit and 

for follow-up with them and/or the need for any referrals to a consulting 

specialist.





The emergency department will also refer you to a specialist when appropriate. 

This referral assures that you have the opportunity for follow-up care with a 

specialist. All of these measure are taken in an effort to provide you with 

optimal care, which includes your follow-up.





Under all circumstances we always encourage you to contact your private 

physician who remains a resource for coordinating your care. When calling for 

follow-up care, please make the office aware that this follow-up is from your 

recent emergency room visit. If for any reason you are refused follow-up, please

contact the [facility] Emergency Department

at [phone] and asked to speak to the emergency department charge nurse.





[facility]


Primary Care


[address] [place] [id number]


Phone: [phone]


Fax: [phone]





[facility]


[address] [place] [address]


Phone: [phone]


Fax: [phone]





Thank you for choosing the [facility] emergency department in 

[place] for your medical needs today.  It was a pleasure caring for you. Today

you were seen in the emergency department for COVID -19, nausea, vomiting.





Your prescription was electronically sent to: [place] [facility]





1.  You were evaluated today on an emergent basis.  Your lab work and chest x-

ray show no acute or concerning findings.  Your vital signs and oxygen saturati

on are well enough that you were able to monitor your symptoms at home.  

Continue to monitor for trouble breathing,  new confusion or inability to 

arouse, bluish lips or face or any of the other symptoms we discussed -if this 

occurs please return to the emergency room immediately.


2.  Please self quarantine until cleared by [facility] Department.  Inform any

persons that you have been in contact with since you started becoming 

symptomatic that you have tested positive; they should be made aware and take 

the appropriate steps as needed.


3.  Zofran has been prescribed for nausea management.  You can take NyQuil 

during the evening to help get a restful night sleep. May alternate Tylenol and 

ibuprofen as needed for pain and fever management.


4. The [facility] department will be calling you and following up with you. 

The [place] COVID 19 Hotline phone number [phone], They are open Monday - 

Friday 7am - 7pm. Follow up with your primary care provider for re-evaluation as

directed.





Sepsis Event Note (ED)





- Focused Exam


Vital Signs: 


                                   Vital Signs











  Temp Pulse Resp BP Pulse Ox


 


 11/11/21 14:49  97.3 F  87  17  136/73  95














- My Orders


Last 24 Hours: 


My Active Orders





11/11/21 14:43


Sodium Chloride 0.9% [Normal Saline] 1,000 ml IV STAT 














- Assessment/Plan


Last 24 Hours: 


My Active Orders





11/11/21 14:43


Sodium Chloride 0.9% [Normal Saline] 1,000 ml IV STAT

## 2021-11-11 NOTE — CR
INDICATION:



Pelvic, shortness of breath 



TECHNIQUE:



Chest 1 view. 



COMPARISON:



12/14/2018



FINDINGS:



Cardiovascular and mediastinum: Heart size and vasculature are normal in 

caliber and appearance.  Mediastinum is within normal limits.  



Lungs and pleural space: Questionable patchy right lung infiltrates. No 

sign of pleural effusion.  No pneumothorax.  



Bones and soft tissues: No significant findings.    



IMPRESSION:



Questionable patchy right infiltrates.



Dictated by Freddy Ken MD @ 11/11/2021 3:25:56 PM



(Electronically Signed)

## 2022-07-12 ENCOUNTER — HOSPITAL ENCOUNTER (EMERGENCY)
Dept: HOSPITAL 56 - MW.ED | Age: 67
Discharge: HOME | End: 2022-07-12
Payer: MEDICARE

## 2022-07-12 VITALS — DIASTOLIC BLOOD PRESSURE: 87 MMHG | HEART RATE: 89 BPM | SYSTOLIC BLOOD PRESSURE: 129 MMHG

## 2022-07-12 DIAGNOSIS — U07.1: Primary | ICD-10-CM

## 2022-07-12 DIAGNOSIS — I10: ICD-10-CM

## 2022-07-12 DIAGNOSIS — E11.9: ICD-10-CM

## 2022-07-12 DIAGNOSIS — Z79.899: ICD-10-CM

## 2022-07-12 LAB
BUN SERPL-MCNC: 21 MG/DL (ref 7–18)
CHLORIDE SERPL-SCNC: 101 MMOL/L (ref 98–107)
CO2 SERPL-SCNC: 27.3 MMOL/L (ref 21–32)
EGFRCR SERPLBLD CKD-EPI 2021: 82 ML/MIN (ref 60–?)
FLUAV RNA UPPER RESP QL NAA+PROBE: NEGATIVE
FLUBV RNA UPPER RESP QL NAA+PROBE: NEGATIVE
GLUCOSE SERPL-MCNC: 147 MG/DL (ref 74–106)
POTASSIUM SERPL-SCNC: 3.9 MMOL/L (ref 3.5–5.1)
SARS-COV-2 RNA RESP QL NAA+PROBE: POSITIVE
SODIUM SERPL-SCNC: 139 MMOL/L (ref 136–148)

## 2022-07-12 PROCEDURE — 85025 COMPLETE CBC W/AUTO DIFF WBC: CPT

## 2022-07-12 PROCEDURE — 0240U: CPT

## 2022-07-12 PROCEDURE — 99283 EMERGENCY DEPT VISIT LOW MDM: CPT

## 2022-07-12 PROCEDURE — 87040 BLOOD CULTURE FOR BACTERIA: CPT

## 2022-07-12 PROCEDURE — 36415 COLL VENOUS BLD VENIPUNCTURE: CPT

## 2022-07-12 PROCEDURE — 96374 THER/PROPH/DIAG INJ IV PUSH: CPT

## 2022-07-12 PROCEDURE — 71045 X-RAY EXAM CHEST 1 VIEW: CPT

## 2022-07-12 PROCEDURE — 83605 ASSAY OF LACTIC ACID: CPT

## 2022-07-12 PROCEDURE — 80053 COMPREHEN METABOLIC PANEL: CPT

## 2025-05-06 NOTE — PCM.PRNOTE
- Free Text/Narrative


Note: 





PROCEDURE: Direct current cardioversion.





REASON FOR PROCEDURE: Atrial flutter/ atrial fibrillation.





PROCEDURE IN DETAIL: The procedure was explained to the patient with risks and 

benefits including risk of stroke. The patient understands . The patient had 

already been on anticogulation religiously including the day of the procedure. 

The pads applied in the anterior and posterior approach. With synchronized 

biphasic waveform at 50 J, one shock was delivered and the rhythm was turned 

into atrial fibrillation with slow rate. Then another attempt of cardioversion 

with biphasic waveform 200 j was performed. The ryhthm was converted to sinus 

bradycardia successfully.  The patient had some occasional PACs noticed with 

occasional sinus bradycardia. The patient had no immediate post-procedure 

complications. The rhythm was maintained and 12-lead EKG was requested.





IMPRESSION: Successful direct current cardioversion with restoration of sinus 

rhythm from atrial flutter and atrial fibrillation with no immediate 

complication. 





Plan


1. post procedure observation for 6 hours


2. continue anticoagulation at least 4 weeks


3. I will start him on AAD amiodarone PO


4. f/u outpatient in one week 0